# Patient Record
Sex: FEMALE | URBAN - METROPOLITAN AREA
[De-identification: names, ages, dates, MRNs, and addresses within clinical notes are randomized per-mention and may not be internally consistent; named-entity substitution may affect disease eponyms.]

---

## 2023-12-11 ENCOUNTER — APPOINTMENT (RX ONLY)
Dept: URBAN - METROPOLITAN AREA CLINIC 121 | Facility: CLINIC | Age: 58
Setting detail: DERMATOLOGY
End: 2023-12-11

## 2023-12-11 DIAGNOSIS — Z41.9 ENCOUNTER FOR PROCEDURE FOR PURPOSES OTHER THAN REMEDYING HEALTH STATE, UNSPECIFIED: ICD-10-CM

## 2023-12-11 PROCEDURE — ? COSMETIC CONSULTATION: FILLERS

## 2023-12-11 PROCEDURE — ? COSMETIC CONSULTATION: BOTOX

## 2023-12-11 ASSESSMENT — LOCATION DETAILED DESCRIPTION DERM
LOCATION DETAILED: RIGHT SUPERIOR VERMILION LIP
LOCATION DETAILED: RIGHT CENTRAL MALAR CHEEK
LOCATION DETAILED: LEFT CENTRAL MALAR CHEEK
LOCATION DETAILED: RIGHT MEDIAL FOREHEAD

## 2023-12-11 ASSESSMENT — LOCATION SIMPLE DESCRIPTION DERM
LOCATION SIMPLE: LEFT CHEEK
LOCATION SIMPLE: RIGHT LIP
LOCATION SIMPLE: RIGHT CHEEK
LOCATION SIMPLE: RIGHT FOREHEAD

## 2023-12-11 ASSESSMENT — LOCATION ZONE DERM
LOCATION ZONE: FACE
LOCATION ZONE: LIP

## 2023-12-19 ENCOUNTER — APPOINTMENT (RX ONLY)
Dept: URBAN - METROPOLITAN AREA CLINIC 121 | Facility: CLINIC | Age: 58
Setting detail: DERMATOLOGY
End: 2023-12-19

## 2023-12-19 DIAGNOSIS — Z41.9 ENCOUNTER FOR PROCEDURE FOR PURPOSES OTHER THAN REMEDYING HEALTH STATE, UNSPECIFIED: ICD-10-CM

## 2023-12-19 PROCEDURE — ? BOTOX (U OR CC)

## 2023-12-19 PROCEDURE — ? DIAGNOSIS COMMENT

## 2023-12-19 PROCEDURE — ? JUVEDERM VOLUMA XC INJECTION

## 2023-12-19 PROCEDURE — ? JUVEDERM ULTRA XC INJECTION

## 2023-12-19 ASSESSMENT — LOCATION DETAILED DESCRIPTION DERM
LOCATION DETAILED: SUBMENTAL CHIN
LOCATION DETAILED: LEFT SUPERIOR VERMILION LIP
LOCATION DETAILED: RIGHT SUPERIOR CENTRAL MALAR CHEEK
LOCATION DETAILED: RIGHT INFERIOR TEMPLE
LOCATION DETAILED: LEFT INFERIOR LATERAL NECK
LOCATION DETAILED: RIGHT CENTRAL EYEBROW
LOCATION DETAILED: LEFT LATERAL CANTHUS
LOCATION DETAILED: LEFT CENTRAL MANDIBULAR CHEEK
LOCATION DETAILED: LEFT INFERIOR VERMILION LIP
LOCATION DETAILED: GLABELLA
LOCATION DETAILED: RIGHT SUPERIOR ANTERIOR NECK
LOCATION DETAILED: LEFT INFERIOR ANTERIOR NECK
LOCATION DETAILED: RIGHT CHIN
LOCATION DETAILED: RIGHT SUPERIOR VERMILION LIP
LOCATION DETAILED: LEFT SUPERIOR CENTRAL MALAR CHEEK
LOCATION DETAILED: RIGHT CENTRAL MANDIBULAR CHEEK
LOCATION DETAILED: RIGHT CENTRAL MALAR CHEEK
LOCATION DETAILED: LEFT CENTRAL EYEBROW
LOCATION DETAILED: LEFT SUPERIOR LATERAL NECK
LOCATION DETAILED: RIGHT LATERAL SUBMANDIBULAR CHEEK
LOCATION DETAILED: LEFT CHIN
LOCATION DETAILED: LEFT CENTRAL LATERAL NECK
LOCATION DETAILED: RIGHT CLAVICULAR NECK
LOCATION DETAILED: RIGHT INFERIOR LATERAL NECK
LOCATION DETAILED: RIGHT INFERIOR CENTRAL MALAR CHEEK
LOCATION DETAILED: LEFT SUPERIOR ANTERIOR NECK
LOCATION DETAILED: RIGHT LATERAL EYEBROW
LOCATION DETAILED: RIGHT CENTRAL LATERAL NECK
LOCATION DETAILED: LEFT FOREHEAD
LOCATION DETAILED: INFERIOR MID FOREHEAD
LOCATION DETAILED: LEFT CLAVICULAR NECK
LOCATION DETAILED: LEFT LATERAL MALAR CHEEK
LOCATION DETAILED: RIGHT FOREHEAD
LOCATION DETAILED: RIGHT LATERAL CANTHUS
LOCATION DETAILED: RIGHT MEDIAL FOREHEAD
LOCATION DETAILED: RIGHT MEDIAL EYEBROW
LOCATION DETAILED: LEFT MEDIAL EYEBROW
LOCATION DETAILED: LEFT LATERAL EYEBROW
LOCATION DETAILED: RIGHT MID TEMPLE

## 2023-12-19 ASSESSMENT — LOCATION ZONE DERM
LOCATION ZONE: EYELID
LOCATION ZONE: NECK
LOCATION ZONE: FACE
LOCATION ZONE: LIP

## 2023-12-19 ASSESSMENT — LOCATION SIMPLE DESCRIPTION DERM
LOCATION SIMPLE: RIGHT FOREHEAD
LOCATION SIMPLE: RIGHT TEMPLE
LOCATION SIMPLE: CHIN
LOCATION SIMPLE: LEFT CHEEK
LOCATION SIMPLE: GLABELLA
LOCATION SIMPLE: LEFT ANTERIOR NECK
LOCATION SIMPLE: LEFT LIP
LOCATION SIMPLE: RIGHT EYEBROW
LOCATION SIMPLE: RIGHT EYELID
LOCATION SIMPLE: LEFT EYEBROW
LOCATION SIMPLE: RIGHT ANTERIOR NECK
LOCATION SIMPLE: SUBMENTAL CHIN
LOCATION SIMPLE: LEFT FOREHEAD
LOCATION SIMPLE: LEFT EYELID
LOCATION SIMPLE: NECK
LOCATION SIMPLE: RIGHT CHEEK
LOCATION SIMPLE: INFERIOR FOREHEAD
LOCATION SIMPLE: RIGHT LIP

## 2023-12-19 NOTE — PROCEDURE: DIAGNOSIS COMMENT
Detail Level: Simple
Render Risk Assessment In Note?: no
Comment: 15% off total price per current promo\\n\\nTotal cost after discount: $1,504.50
Comment: Post treatment no obvious bruising. Patient instructed to apply Arnica gel 3-4 times daily for any swelling/bruising. Post procedure capillary refill less than 3 seconds. Patient educated on signs and symptoms of vascular occlusion and provided with the injector’s cell phone number for any concerns. Patient pleased with results.\\n\\n\\nTotal cost after discounts: $2,635

## 2023-12-19 NOTE — PROCEDURE: BOTOX (U OR CC)
Expiration Date (Month Year): 011/2025
Forehead Units/Cc: 10
Additional Area 5 Units: 0
Additional Area 1 Location: chin
Additional Area 2 Location: neck fine lines microtoxin
Including Pricing Information In The Note: No
Additional Area 3 Units: 12
Document As Units Or Cc?: units
Detail Level: Detailed
Lateral Platysmal Bands Units: 20
Nasal Root Units/Cc: 6
Dilution (U/0.1 Cc): 4
Consent: Written consent obtained. Risks include but not limited to lid/brow ptosis, bruising, swelling, diplopia, temporary effect, incomplete chemical denervation.
Post-Care Instructions: Patient instructed to not lie down for 4 hours and limit physical activity for 24 hours.
Lot #: [id number]
Periorbital Skin Units/Cc: 22
Additional Area 3 Location: Jawline
Quantity Per Injection Site (Units Or Cc): 6 U
Quantity Per Injection Site (Units Or Cc): 4 U
Quantity Per Injection Site (Units Or Cc): 2 U
Quantity Per Injection Site (Units Or Cc): 1 U

## 2023-12-19 NOTE — PROCEDURE: JUVEDERM VOLUMA XC INJECTION
Detail Level: Detailed
Show Inventory Tab: Hide
Use Map Statement For Sites (Optional): No
Map Statment: See Attach Map for Complete Details
Anesthesia Type: 0.3% lidocaine (mixed within filler)
Anesthesia Volume In Cc: 0.5
Filler: Juvederm Voluma XC
Topical Anesthesia?: BLT cream (benzocaine 20%, lidocaine 6%, tetracaine 4%)
Dorsal Hands Filler Volume In Cc: 0
Number Of Syringes (Required For Inventory): 1
Cheeks Filler Volume In Cc: 2
Lot #: [id number]
Expiration Date (Month Year): 9/26/2024
Procedural Text: The filler was administered to the treatment areas noted above.
Consent: Written consent obtained. Risks include but not limited to bruising, beading, irregular texture, ulceration, infection, allergic reaction, scar formation, incomplete augmentation, temporary nature, procedural pain.
Post-Care Instructions: Patient instructed to apply ice to reduce swelling.

## 2023-12-19 NOTE — PROCEDURE: JUVEDERM ULTRA XC INJECTION
Detail Level: Detailed
Show Inventory Tab: Hide
Use Map Statement For Sites (Optional): No
Map Statment: See Attach Map for Complete Details
Anesthesia Volume In Cc: 0.5
Anesthesia Type: 0.3% lidocaine (mixed within filler)
Additional Anesthesia Volume In Cc: 0
Filler: Juvederm Ultra XC
Vermilion Lips Filler Volume In Cc: 1
Lot #: [id number]
Expiration Date (Month Year): 7/12/2024
Procedural Text: The filler was administered to the treatment areas noted above.
Consent: Written consent obtained. Risks include but not limited to bruising, beading, irregular texture, ulceration, infection, allergic reaction, scar formation, incomplete augmentation, temporary nature, procedural pain.
Post-Care Instructions: Patient instructed to apply ice to reduce swelling.

## 2024-01-04 ENCOUNTER — APPOINTMENT (RX ONLY)
Dept: URBAN - METROPOLITAN AREA CLINIC 116 | Facility: CLINIC | Age: 59
Setting detail: DERMATOLOGY
End: 2024-01-04

## 2024-01-04 DIAGNOSIS — Z41.9 ENCOUNTER FOR PROCEDURE FOR PURPOSES OTHER THAN REMEDYING HEALTH STATE, UNSPECIFIED: ICD-10-CM

## 2024-01-04 PROCEDURE — ? INVENTORY

## 2024-01-04 PROCEDURE — ? FILLERS

## 2024-01-04 ASSESSMENT — LOCATION DETAILED DESCRIPTION DERM
LOCATION DETAILED: LEFT MEDIAL BUCCAL CHEEK
LOCATION DETAILED: RIGHT CENTRAL BUCCAL CHEEK
LOCATION DETAILED: RIGHT INFERIOR MEDIAL MALAR CHEEK
LOCATION DETAILED: LEFT SUPERIOR VERMILION LIP
LOCATION DETAILED: LEFT INFERIOR MEDIAL MALAR CHEEK

## 2024-01-04 ASSESSMENT — LOCATION ZONE DERM
LOCATION ZONE: LIP
LOCATION ZONE: FACE

## 2024-01-04 ASSESSMENT — LOCATION SIMPLE DESCRIPTION DERM
LOCATION SIMPLE: RIGHT CHEEK
LOCATION SIMPLE: LEFT CHEEK
LOCATION SIMPLE: LEFT LIP

## 2024-01-04 NOTE — PROCEDURE: FILLERS
Aspiration Statement: Aspiration was performed prior to injecting site with filler.
Temple Hollows Filler Volume In Cc: 0
Vermilion Lips Filler Volume In Cc: 0.1
Include Cannula Information In Note?: No
Anesthesia Type: 1% lidocaine with epinephrine
Anesthesia Volume In Cc: 0.5
Additional Anesthesia Volume In Cc: 6
Detail Level: Detailed
Filler: Juvederm Ultra Plus XC
Lot #: [id number]
Consent: Written consent obtained. Risks include but not limited to bruising, beading, irregular texture, ulceration, infection, allergic reaction, scar formation, incomplete augmentation, temporary nature, and procedural pain.
Map Statment: See Attach Map for Complete Details
Post-Care Instructions: After the procedure, patient instructed to apply ice to reduce swelling.
Marionette Lines Filler Volume In Cc: 0.2
Expiration Date (Month Year): 02/07/2024
Nasolabial Folds Filler Volume In Cc: 0.7

## 2024-01-15 ENCOUNTER — APPOINTMENT (RX ONLY)
Dept: URBAN - METROPOLITAN AREA CLINIC 121 | Facility: CLINIC | Age: 59
Setting detail: DERMATOLOGY
End: 2024-01-15

## 2024-01-15 DIAGNOSIS — Z41.9 ENCOUNTER FOR PROCEDURE FOR PURPOSES OTHER THAN REMEDYING HEALTH STATE, UNSPECIFIED: ICD-10-CM

## 2024-01-15 PROCEDURE — ? COSMETIC FOLLOW-UP

## 2024-01-15 PROCEDURE — ? HYLENEX INJECTION

## 2024-01-15 PROCEDURE — ? PHOTO-DOCUMENTATION

## 2024-01-15 PROCEDURE — ? ADDITIONAL NOTES

## 2024-01-15 ASSESSMENT — LOCATION SIMPLE DESCRIPTION DERM
LOCATION SIMPLE: LEFT CHEEK
LOCATION SIMPLE: RIGHT CHEEK

## 2024-01-15 ASSESSMENT — LOCATION DETAILED DESCRIPTION DERM
LOCATION DETAILED: LEFT CENTRAL MALAR CHEEK
LOCATION DETAILED: RIGHT CENTRAL MALAR CHEEK

## 2024-01-15 ASSESSMENT — LOCATION ZONE DERM: LOCATION ZONE: FACE

## 2024-01-15 NOTE — PROCEDURE: HYLENEX INJECTION
Remains on the Advair inhaler and as needed Xopenex.  Does not use that very often but has it is a nebulizer.  Could print a prescription for Xopenex inhaler  
Anesthesia Type: 1% lidocaine without epinephrine
Dorsal Hands Hylenex  Volume In Cc: 0
Expiration Date (Month Year): 6/2025
Injectible: Hylenex
Show Inventory Tab: Hide
Include Cannula Information In Note?: No
Map Statment: See Attach Map for Complete Details
Post-Care Instructions: Patient instructed to apply ice to reduce swelling.
Cheeks Hylenex Volume In Cc: 1
Procedural Text: The Hylenex was administered to the treatment areas noted above.
Anesthesia Volume In Cc: 0.2
Consent: Written consent obtained. Risks include but not limited to bruising, beading, irregular texture, ulceration, infection, allergic reaction, scar formation, incomplete augmentation, temporary nature, procedural pain.
Lot #: [id number]
Topical Anesthesia?: BLT cream (benzocaine 20%, lidocaine 6%, tetracaine 4%)
Detail Level: Detailed

## 2024-01-15 NOTE — PROCEDURE: ADDITIONAL NOTES
Render Risk Assessment In Note?: no
Detail Level: Generalized
Additional Notes: Patient noted a puffy area to superior cheeks when smiling hard. I see that as well on exam. When face is at rest there are no side effects noted and patient is pleased with results. The puffiness is soft, no filler is palpated. This may be caused by neurotoxin relaxation of periorbital muscles or the severity of volume depletion in the cheeks/bags under the eyes that are now more prominent post filler. I placed some Hylenex in the upper cheek area to see if this would soften the severity of the puffiness which resulted in minimal improvement leading me to believe this is a neurotoxin side effect as this was not observed at the last appointment. Patient will return for re-assessment in 1 week.

## 2024-01-15 NOTE — PROCEDURE: COSMETIC FOLLOW-UP
Global Improvement: Very Good
Side Effects Override (Free Text): Puffiness in cheeks when smiling hard
Patient Satisfaction: Pleased
Treatment (Optional): Filler Injection
Detail Level: Zone

## 2024-05-16 ENCOUNTER — APPOINTMENT (RX ONLY)
Dept: URBAN - METROPOLITAN AREA CLINIC 116 | Facility: CLINIC | Age: 59
Setting detail: DERMATOLOGY
End: 2024-05-16

## 2024-05-16 DIAGNOSIS — Z41.9 ENCOUNTER FOR PROCEDURE FOR PURPOSES OTHER THAN REMEDYING HEALTH STATE, UNSPECIFIED: ICD-10-CM

## 2024-05-16 PROCEDURE — ? BOTOX (U OR CC)

## 2024-05-16 PROCEDURE — ? INVENTORY

## 2024-05-16 ASSESSMENT — LOCATION DETAILED DESCRIPTION DERM
LOCATION DETAILED: LEFT CHIN
LOCATION DETAILED: LEFT CENTRAL EYEBROW
LOCATION DETAILED: SUPERIOR MID FOREHEAD
LOCATION DETAILED: RIGHT INFERIOR LATERAL NECK
LOCATION DETAILED: RIGHT MEDIAL EYEBROW
LOCATION DETAILED: RIGHT FOREHEAD
LOCATION DETAILED: RIGHT CENTRAL EYEBROW
LOCATION DETAILED: LEFT FOREHEAD
LOCATION DETAILED: RIGHT INFERIOR ANTERIOR NECK
LOCATION DETAILED: RIGHT CHIN
LOCATION DETAILED: RIGHT INFERIOR MEDIAL FOREHEAD
LOCATION DETAILED: LEFT INFERIOR ANTERIOR NECK
LOCATION DETAILED: GLABELLA
LOCATION DETAILED: LEFT MEDIAL EYEBROW
LOCATION DETAILED: NASAL ROOT
LOCATION DETAILED: LEFT INFERIOR LATERAL NECK
LOCATION DETAILED: LEFT INFERIOR FOREHEAD

## 2024-05-16 ASSESSMENT — LOCATION SIMPLE DESCRIPTION DERM
LOCATION SIMPLE: RIGHT EYEBROW
LOCATION SIMPLE: LEFT EYEBROW
LOCATION SIMPLE: CHIN
LOCATION SIMPLE: SUPERIOR FOREHEAD
LOCATION SIMPLE: RIGHT ANTERIOR NECK
LOCATION SIMPLE: GLABELLA
LOCATION SIMPLE: LEFT FOREHEAD
LOCATION SIMPLE: RIGHT FOREHEAD
LOCATION SIMPLE: LEFT ANTERIOR NECK
LOCATION SIMPLE: NOSE

## 2024-05-16 ASSESSMENT — LOCATION ZONE DERM
LOCATION ZONE: NOSE
LOCATION ZONE: FACE
LOCATION ZONE: NECK

## 2024-05-16 NOTE — PROCEDURE: BOTOX (U OR CC)
Additional Area 2 Location: neck fine lines microtoxin
Additional Area 1 Location: chin
Additional Area 6 Units: 0
Anterior Platysmal Bands Units: 20
Detail Level: Detailed
Nasal Root Units/Cc: 6
Periorbital Skin Units/Cc: 22
Dilution (U/0.1 Cc): 4
Additional Area 3 Location: microtoxin- low forehead line
Post-Care Instructions: Patient instructed to not lie down for 4 hours and limit physical activity for 24 hours.
Document As Units Or Cc?: units
Including Pricing Information In The Note: No
Forehead Units/Cc: 10
Glabellar Complex Units: 8
Expiration Date (Month Year): 05/2026
Consent: Written consent obtained. Risks include but not limited to lid/brow ptosis, bruising, swelling, diplopia, temporary effect, incomplete chemical denervation.
Additional Area 2 Units: 14
Lot #: [id number]
Quantity Per Injection Site (Units Or Cc): 4 U
Quantity Per Injection Site (Units Or Cc): 2 U

## 2024-06-03 ENCOUNTER — APPOINTMENT (RX ONLY)
Dept: URBAN - METROPOLITAN AREA CLINIC 121 | Facility: CLINIC | Age: 59
Setting detail: DERMATOLOGY
End: 2024-06-03

## 2024-06-03 DIAGNOSIS — Z41.9 ENCOUNTER FOR PROCEDURE FOR PURPOSES OTHER THAN REMEDYING HEALTH STATE, UNSPECIFIED: ICD-10-CM

## 2024-06-03 PROCEDURE — ? INVENTORY

## 2024-06-03 PROCEDURE — ? BOTOX (U OR CC)

## 2024-06-03 PROCEDURE — ? COSMETIC FOLLOW-UP

## 2024-06-03 ASSESSMENT — LOCATION SIMPLE DESCRIPTION DERM
LOCATION SIMPLE: LEFT FOREHEAD
LOCATION SIMPLE: LEFT ZYGOMA
LOCATION SIMPLE: RIGHT ANTERIOR NECK
LOCATION SIMPLE: LEFT ANTERIOR NECK
LOCATION SIMPLE: RIGHT TEMPLE

## 2024-06-03 ASSESSMENT — LOCATION DETAILED DESCRIPTION DERM
LOCATION DETAILED: LEFT MEDIAL ZYGOMA
LOCATION DETAILED: RIGHT INFERIOR LATERAL NECK
LOCATION DETAILED: LEFT INFERIOR LATERAL NECK
LOCATION DETAILED: LEFT MEDIAL FOREHEAD
LOCATION DETAILED: RIGHT CENTRAL TEMPLE

## 2024-06-03 ASSESSMENT — LOCATION ZONE DERM
LOCATION ZONE: FACE
LOCATION ZONE: NECK

## 2024-06-03 NOTE — PROCEDURE: BOTOX (U OR CC)
Lateral Platysmal Bands Units: 0
Expiration Date (Month Year): 02/2026
Document As Units Or Cc?: units
Dilution (U/0.1 Cc): 1.1
Lot #: [id number]
Consent: Written consent obtained. Risks include but not limited to lid/brow ptosis, bruising, swelling, diplopia, temporary effect, incomplete chemical denervation.
Detail Level: Detailed
Reconstitution Date (Optional): 6/3/2023
Including Pricing Information In The Note: No
Post-Care Instructions: Patient instructed to not lie down for 4 hours and limit physical activity for 24 hours.
Periorbital Skin Units/Cc: 36
Quantity Per Injection Site (Units Or Cc): 18 U

## 2024-06-03 NOTE — PROCEDURE: COSMETIC FOLLOW-UP
Patient Satisfaction: Very pleased
Side Effects Or Complications: None
Detail Level: Zone
Global Improvement: Very Good